# Patient Record
Sex: MALE | Race: WHITE | Employment: UNEMPLOYED | URBAN - METROPOLITAN AREA
[De-identification: names, ages, dates, MRNs, and addresses within clinical notes are randomized per-mention and may not be internally consistent; named-entity substitution may affect disease eponyms.]

---

## 2023-10-09 ENCOUNTER — OFFICE VISIT (OUTPATIENT)
Dept: FAMILY MEDICINE CLINIC | Age: 63
End: 2023-10-09
Payer: COMMERCIAL

## 2023-10-09 VITALS
TEMPERATURE: 98.2 F | WEIGHT: 241.8 LBS | BODY MASS INDEX: 27.98 KG/M2 | SYSTOLIC BLOOD PRESSURE: 112 MMHG | DIASTOLIC BLOOD PRESSURE: 64 MMHG | HEIGHT: 78 IN

## 2023-10-09 DIAGNOSIS — S83.102A KNEE SUBLUXATION, LEFT, INITIAL ENCOUNTER: ICD-10-CM

## 2023-10-09 DIAGNOSIS — M23.204 DEGENERATIVE TEAR OF LEFT MEDIAL MENISCUS: ICD-10-CM

## 2023-10-09 DIAGNOSIS — M25.562 ACUTE PAIN OF LEFT KNEE: Primary | ICD-10-CM

## 2023-10-09 PROCEDURE — 99213 OFFICE O/P EST LOW 20 MIN: CPT | Performed by: FAMILY MEDICINE

## 2023-10-09 RX ORDER — BUSPIRONE HYDROCHLORIDE 30 MG/1
30 TABLET ORAL 2 TIMES DAILY
COMMUNITY
Start: 2023-08-27

## 2023-10-09 RX ORDER — ZOLPIDEM TARTRATE 10 MG/1
10 TABLET ORAL
COMMUNITY
Start: 2023-08-10

## 2023-10-09 RX ORDER — HYDROXYZINE PAMOATE 50 MG/1
CAPSULE ORAL
COMMUNITY
Start: 2023-08-10

## 2023-10-09 RX ORDER — VENLAFAXINE HYDROCHLORIDE 150 MG/1
300 CAPSULE, EXTENDED RELEASE ORAL DAILY
COMMUNITY
Start: 2023-08-10

## 2023-10-09 RX ORDER — LURASIDONE HYDROCHLORIDE 60 MG/1
60 TABLET, FILM COATED ORAL
COMMUNITY
Start: 2023-08-10

## 2023-10-09 RX ORDER — MIRTAZAPINE 30 MG/1
30 TABLET, FILM COATED ORAL
COMMUNITY
Start: 2023-08-03

## 2023-10-09 SDOH — ECONOMIC STABILITY: HOUSING INSECURITY
IN THE LAST 12 MONTHS, WAS THERE A TIME WHEN YOU DID NOT HAVE A STEADY PLACE TO SLEEP OR SLEPT IN A SHELTER (INCLUDING NOW)?: NO

## 2023-10-09 SDOH — ECONOMIC STABILITY: INCOME INSECURITY: HOW HARD IS IT FOR YOU TO PAY FOR THE VERY BASICS LIKE FOOD, HOUSING, MEDICAL CARE, AND HEATING?: NOT HARD AT ALL

## 2023-10-09 SDOH — ECONOMIC STABILITY: FOOD INSECURITY: WITHIN THE PAST 12 MONTHS, YOU WORRIED THAT YOUR FOOD WOULD RUN OUT BEFORE YOU GOT MONEY TO BUY MORE.: NEVER TRUE

## 2023-10-09 SDOH — ECONOMIC STABILITY: FOOD INSECURITY: WITHIN THE PAST 12 MONTHS, THE FOOD YOU BOUGHT JUST DIDN'T LAST AND YOU DIDN'T HAVE MONEY TO GET MORE.: NEVER TRUE

## 2023-10-09 ASSESSMENT — PATIENT HEALTH QUESTIONNAIRE - PHQ9
SUM OF ALL RESPONSES TO PHQ QUESTIONS 1-9: 0
2. FEELING DOWN, DEPRESSED OR HOPELESS: 0
SUM OF ALL RESPONSES TO PHQ QUESTIONS 1-9: 0
SUM OF ALL RESPONSES TO PHQ9 QUESTIONS 1 & 2: 0
SUM OF ALL RESPONSES TO PHQ QUESTIONS 1-9: 0
SUM OF ALL RESPONSES TO PHQ QUESTIONS 1-9: 0
1. LITTLE INTEREST OR PLEASURE IN DOING THINGS: 0

## 2023-10-09 ASSESSMENT — ENCOUNTER SYMPTOMS
CHEST TIGHTNESS: 0
SHORTNESS OF BREATH: 0

## 2023-10-09 NOTE — PROGRESS NOTES
Subjective  Nickolas Deshpande 1960 is a 66 y.o. male who presents today with:  Chief Complaint   Patient presents with    Knee Pain     C/O left knee pain. This has been a concern for about 1 month. Denies injury. Reports his knee feels like it is dislocated when he squats down or crouches. He has some mild swelling. Denies worsening pain at night. Denies discoloration. He has been using Voltaren gel PRN for relief. Depression     He follows with psychiatry. Moods stable with current medications. Health Maintenance     Reports it has been several years since he has had a PCP. No recent labs. Declines colonoscopy. I have reviewed HPI and agree with above. This first happened about a month ago. He was bending over to pick something up and the knee locked up on him. The pain is excruciating. He demonstrates a maneuver he does to get it to pop back in. He does this by flexing his hip and bringing his knee all the way up to his chest and pulling tight. Since it happened that once it is happened several other times. The pain tends to be lateral and posterior. He denies any swelling. It can make it very difficult for him to walk. There is no family history of joint issues. 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 He has never had any other joints \"pop out\". Review of Systems   Respiratory:  Negative for chest tightness and shortness of breath. Cardiovascular:  Negative for chest pain, palpitations and leg swelling. Musculoskeletal:  Negative for neck pain and neck stiffness. Psychiatric/Behavioral:  Positive for depression. All other systems reviewed and are negative. History reviewed. No pertinent past medical history. History reviewed. No pertinent surgical history.   Social History     Socioeconomic History    Marital status:      Spouse name: Not on file    Number of children: Not on file    Years of education: Not on file

## 2023-10-17 ENCOUNTER — HOSPITAL ENCOUNTER (OUTPATIENT)
Dept: MRI IMAGING | Age: 63
Discharge: HOME | End: 2023-10-19
Attending: FAMILY MEDICINE
Payer: COMMERCIAL

## 2023-10-17 DIAGNOSIS — S83.102A KNEE SUBLUXATION, LEFT, INITIAL ENCOUNTER: ICD-10-CM

## 2023-10-17 DIAGNOSIS — M25.562 ACUTE PAIN OF LEFT KNEE: ICD-10-CM

## 2023-10-17 DIAGNOSIS — M23.204 DEGENERATIVE TEAR OF LEFT MEDIAL MENISCUS: ICD-10-CM

## 2023-10-17 PROCEDURE — 73721 MRI JNT OF LWR EXTRE W/O DYE: CPT

## 2023-10-18 DIAGNOSIS — M23.204 DEGENERATIVE TEAR OF LEFT MEDIAL MENISCUS: Primary | ICD-10-CM

## 2023-10-23 ENCOUNTER — OFFICE VISIT (OUTPATIENT)
Dept: ORTHOPEDIC SURGERY | Age: 63
End: 2023-10-23

## 2023-10-23 VITALS
OXYGEN SATURATION: 96 % | TEMPERATURE: 98.4 F | WEIGHT: 241 LBS | BODY MASS INDEX: 27.88 KG/M2 | HEART RATE: 84 BPM | HEIGHT: 78 IN

## 2023-10-23 DIAGNOSIS — S83.232A COMPLEX TEAR OF MEDIAL MENISCUS OF LEFT KNEE AS CURRENT INJURY, INITIAL ENCOUNTER: Primary | ICD-10-CM

## 2023-10-23 RX ORDER — LIDOCAINE HYDROCHLORIDE 10 MG/ML
8 INJECTION, SOLUTION INFILTRATION; PERINEURAL ONCE
Status: SHIPPED | OUTPATIENT
Start: 2023-10-23

## 2023-10-23 RX ORDER — TRIAMCINOLONE ACETONIDE 40 MG/ML
80 INJECTION, SUSPENSION INTRA-ARTICULAR; INTRAMUSCULAR ONCE
Status: SHIPPED | OUTPATIENT
Start: 2023-10-23

## 2023-10-26 ENCOUNTER — OFFICE VISIT (OUTPATIENT)
Dept: FAMILY MEDICINE CLINIC | Age: 63
End: 2023-10-26
Payer: COMMERCIAL

## 2023-10-26 VITALS
TEMPERATURE: 97.8 F | SYSTOLIC BLOOD PRESSURE: 128 MMHG | HEART RATE: 68 BPM | HEIGHT: 78 IN | OXYGEN SATURATION: 97 % | WEIGHT: 237 LBS | BODY MASS INDEX: 27.42 KG/M2 | DIASTOLIC BLOOD PRESSURE: 78 MMHG

## 2023-10-26 DIAGNOSIS — M25.531 WRIST PAIN, RIGHT: Primary | ICD-10-CM

## 2023-10-26 DIAGNOSIS — S83.207D ACUTE MENISCAL TEAR OF KNEE, LEFT, SUBSEQUENT ENCOUNTER: ICD-10-CM

## 2023-10-26 PROCEDURE — G8419 CALC BMI OUT NRM PARAM NOF/U: HCPCS | Performed by: PHYSICIAN ASSISTANT

## 2023-10-26 PROCEDURE — 1036F TOBACCO NON-USER: CPT | Performed by: PHYSICIAN ASSISTANT

## 2023-10-26 PROCEDURE — 3017F COLORECTAL CA SCREEN DOC REV: CPT | Performed by: PHYSICIAN ASSISTANT

## 2023-10-26 PROCEDURE — 99213 OFFICE O/P EST LOW 20 MIN: CPT | Performed by: PHYSICIAN ASSISTANT

## 2023-10-26 PROCEDURE — G8427 DOCREV CUR MEDS BY ELIG CLIN: HCPCS | Performed by: PHYSICIAN ASSISTANT

## 2023-10-26 PROCEDURE — G8484 FLU IMMUNIZE NO ADMIN: HCPCS | Performed by: PHYSICIAN ASSISTANT

## 2023-10-26 RX ORDER — HYDROCODONE BITARTRATE AND ACETAMINOPHEN 5; 325 MG/1; MG/1
1 TABLET ORAL EVERY 8 HOURS PRN
Qty: 21 TABLET | Refills: 0 | Status: SHIPPED | OUTPATIENT
Start: 2023-10-26 | End: 2023-11-02

## 2023-10-26 RX ORDER — MELOXICAM 15 MG/1
15 TABLET ORAL DAILY
Qty: 30 TABLET | Refills: 0 | Status: SHIPPED | OUTPATIENT
Start: 2023-10-26

## 2023-10-26 RX ORDER — METHYLPREDNISOLONE 4 MG/1
TABLET ORAL
Qty: 1 KIT | Refills: 0 | Status: SHIPPED | OUTPATIENT
Start: 2023-10-26 | End: 2023-11-01

## 2023-10-26 NOTE — PROGRESS NOTES
Conjunctiva/sclera: Conjunctivae normal.      Pupils: Pupils are equal, round, and reactive to light. Neck:      Thyroid: No thyromegaly. Cardiovascular:      Heart sounds: No murmur heard. Pulmonary:      Effort: Pulmonary effort is normal. No respiratory distress. Musculoskeletal:         General: Tenderness present. Normal range of motion. Right knee: Tenderness present over the MCL. Comments: +phalens right   Skin:     General: Skin is warm and dry. Coloration: Skin is not jaundiced or pale. Neurological:      Mental Status: He is alert and oriented to person, place, and time. Motor: No weakness. Coordination: Coordination normal.      Gait: Gait normal.   Psychiatric:         Mood and Affect: Mood normal.         Thought Content: Thought content normal.         Judgment: Judgment normal.            Assessment & Plan    Diagnosis Orders   1. Wrist pain, right  [facility] Clavicle Strap, Right      2.  Acute meniscal tear of knee, left, subsequent encounter  methylPREDNISolone (MEDROL DOSEPACK) 4 MG tablet    HYDROcodone-acetaminophen (NORCO) 5-325 MG per tablet    meloxicam (MOBIC) 15 MG tablet    Ambulatory referral to Orthopedic Surgery    [facility] Hinged Knee Support, Left; L            Orders Placed This Encounter   Procedures    Ambulatory referral to Orthopedic Surgery     Referral Priority:   Routine     Referral Type:   Eval and Treat     Referral Reason:   Specialty Services Required     Referred to Provider:   VALARIE Ibrahim     Number of Visits Requested:   1    [facility] Hinged Knee Support, Left; L     Order Specific Question:   Equipment:     Answer:   Hinged Knee Support, Left     Order Specific Question:   Size     Answer:   L    [facility] Clavicle Strap, Right     Order Specific Question:   Equipment:     Answer:   Clavicle Strap, Right     Orders Placed This Encounter

## 2023-10-31 ENCOUNTER — OFFICE VISIT (OUTPATIENT)
Dept: ORTHOPEDIC SURGERY | Age: 63
End: 2023-10-31
Payer: COMMERCIAL

## 2023-10-31 VITALS
BODY MASS INDEX: 27.98 KG/M2 | HEART RATE: 67 BPM | HEIGHT: 77 IN | OXYGEN SATURATION: 99 % | WEIGHT: 237 LBS | TEMPERATURE: 97.3 F

## 2023-10-31 DIAGNOSIS — G56.01 CARPAL TUNNEL SYNDROME OF RIGHT WRIST: Primary | ICD-10-CM

## 2023-10-31 PROCEDURE — 20526 THER INJECTION CARP TUNNEL: CPT | Performed by: PHYSICIAN ASSISTANT

## 2023-10-31 PROCEDURE — G8484 FLU IMMUNIZE NO ADMIN: HCPCS | Performed by: PHYSICIAN ASSISTANT

## 2023-10-31 PROCEDURE — 1036F TOBACCO NON-USER: CPT | Performed by: PHYSICIAN ASSISTANT

## 2023-10-31 PROCEDURE — 3017F COLORECTAL CA SCREEN DOC REV: CPT | Performed by: PHYSICIAN ASSISTANT

## 2023-10-31 PROCEDURE — G8427 DOCREV CUR MEDS BY ELIG CLIN: HCPCS | Performed by: PHYSICIAN ASSISTANT

## 2023-10-31 PROCEDURE — 99214 OFFICE O/P EST MOD 30 MIN: CPT | Performed by: PHYSICIAN ASSISTANT

## 2023-10-31 PROCEDURE — G8419 CALC BMI OUT NRM PARAM NOF/U: HCPCS | Performed by: PHYSICIAN ASSISTANT

## 2023-10-31 RX ORDER — LIDOCAINE HYDROCHLORIDE 10 MG/ML
1 INJECTION, SOLUTION INFILTRATION; PERINEURAL ONCE
Status: COMPLETED | OUTPATIENT
Start: 2023-10-31 | End: 2023-10-31

## 2023-10-31 RX ORDER — TRIAMCINOLONE ACETONIDE 40 MG/ML
40 INJECTION, SUSPENSION INTRA-ARTICULAR; INTRAMUSCULAR ONCE
Status: COMPLETED | OUTPATIENT
Start: 2023-10-31 | End: 2023-10-31

## 2023-10-31 RX ADMIN — LIDOCAINE HYDROCHLORIDE 1 ML: 10 INJECTION, SOLUTION INFILTRATION; PERINEURAL at 16:08

## 2023-10-31 RX ADMIN — TRIAMCINOLONE ACETONIDE 40 MG: 40 INJECTION, SUSPENSION INTRA-ARTICULAR; INTRAMUSCULAR at 16:09

## 2023-10-31 ASSESSMENT — ENCOUNTER SYMPTOMS
RESPIRATORY NEGATIVE: 1
RESPIRATORY NEGATIVE: 1

## 2023-10-31 NOTE — PROGRESS NOTES
Lj Diop (:  1960) is a 66 y.o. male,Established patient, here for evaluation of the following chief complaint(s):  Wrist Pain (Pt here for right wrist pain)         ASSESSMENT/PLAN:  1. Carpal tunnel syndrome of right wrist  -     TX INJECTION THERAPEUTIC CARPAL TUNNEL  -     lidocaine 1 % injection 1 mL; 1 mL, Intra-artICUlar, ONCE, 1 dose, On Tue 10/31/23 at 1530  -     triamcinolone acetonide (KENALOG-40) injection 40 mg; 40 mg, Intra-artICUlar, ONCE, 1 dose, On Tue 10/31/23 at 1530      No follow-ups on file. Subjective   SUBJECTIVE/OBJECTIVE:  This is a 79-year-old right-hand-dominant male complaining of right hand numbness. He states he was told previously has carpal tunnel. States the numbness awakens him from sleep. He has to shake his hand to wake in the hand from sleep. He denies any injury. He does not want to proceed with surgery. Review of Systems   Constitutional: Negative. HENT: Negative. Respiratory: Negative. Skin: Negative. Neurological: Negative. Objective   Physical Exam  Musculoskeletal:      Comments: Right hand-full extension of all fingers. Hand grasp is strong and equal.  Phalen's is positive. Tinel's does elicit very mild neurologic symptoms. Thenar wasting is present. Decree sensation to light touch of the index and middle fingers. Capillary refill is brisk     Procedure-after sterile prep and under aseptic technique 1 cc 1% lidocaine and 1 cc 40 mg/mL Kenalog are injected into the right carpal tunnel. Patient tolerated seizure well. I explained to the patient he would be a candidate for carpal tunnel release surgery but before that we need to repeat his EMG. He states he does not want to proceed with surgery. Would like to proceed with cortisone injection of the right carpal tunnel. I injected the patient's right carpal tunnel today with cortisone.   We will see him back in about 3 weeks  On this date 10/31/2023 I

## 2023-10-31 NOTE — PROGRESS NOTES
Wellington Marshall (:  1960) is a 66 y.o. male,New patient, consult from Dr. Margot Castillo here for evaluation of the following chief complaint(s):  New Patient (Patient presents for left knee pain. He has had an MRI on it. )         ASSESSMENT/PLAN:  1. Complex tear of medial meniscus of left knee as current injury, initial encounter  -     UT ARTHROCENTESIS ASPIR&/INJ MAJOR JT/BURSA W/O US  -     lidocaine 1 % injection 8 mL; 8 mL, Intra-artICUlar, ONCE, 1 dose, On Mon 10/23/23 at 1645  -     triamcinolone acetonide (KENALOG-40) injection 80 mg; 80 mg, Intra-artICUlar, ONCE, 1 dose, On Mon 10/23/23 at 1645      No follow-ups on file. Subjective   SUBJECTIVE/OBJECTIVE:  This is a 59-year-old unemployed male complaining of left knee pain. He states about 6 weeks ago he was bending over and his left knee locked. He states the knee is locked several times. He states having difficulty pivoting on the knee secondary to pain. He states the knee feels stable except when he has the pain. His primary care providerOrdered an MRI scan and is he is here for results. Review of Systems   Constitutional: Negative. HENT: Negative. Respiratory: Negative. Skin: Negative. Neurological: Negative. Objective   Lenora Gupta MD  [phone] 10/18/2023      Narrative & Impression  EXAMINATION:  MRI OF THE LEFT KNEE WITHOUT CONTRAST, 10/17/2023 7:22 pm     TECHNIQUE:  Multiplanar multisequence MRI of the left knee was performed without the  administration of intravenous contrast.     COMPARISON:  None. HISTORY:  ORDERING SYSTEM PROVIDED HISTORY: Acute pain of left knee, Degenerative tear  of left medial meniscus, Knee subluxation, left, initial encounter  TECHNOLOGIST PROVIDED HISTORY:  What reading provider will be dictating this exam?->CRC     FINDINGS:  MENISCI: Oblique horizontal tear of the posterior horn medial meniscus  extending into the body segment.   Intact lateral

## 2023-11-29 ENCOUNTER — OFFICE VISIT (OUTPATIENT)
Dept: FAMILY MEDICINE CLINIC | Age: 63
End: 2023-11-29
Payer: COMMERCIAL

## 2023-11-29 VITALS
WEIGHT: 233 LBS | DIASTOLIC BLOOD PRESSURE: 84 MMHG | TEMPERATURE: 97.5 F | HEIGHT: 77 IN | SYSTOLIC BLOOD PRESSURE: 136 MMHG | HEART RATE: 69 BPM | OXYGEN SATURATION: 97 % | BODY MASS INDEX: 27.51 KG/M2

## 2023-11-29 DIAGNOSIS — S83.207D ACUTE MENISCAL TEAR OF KNEE, LEFT, SUBSEQUENT ENCOUNTER: Primary | ICD-10-CM

## 2023-11-29 DIAGNOSIS — E78.2 MIXED HYPERLIPIDEMIA: ICD-10-CM

## 2023-11-29 DIAGNOSIS — M25.50 ARTHRALGIA, UNSPECIFIED JOINT: ICD-10-CM

## 2023-11-29 DIAGNOSIS — Z13.220 LIPID SCREENING: ICD-10-CM

## 2023-11-29 DIAGNOSIS — Z12.11 COLON CANCER SCREENING: ICD-10-CM

## 2023-11-29 DIAGNOSIS — Z12.5 PROSTATE CANCER SCREENING: ICD-10-CM

## 2023-11-29 DIAGNOSIS — Z13.1 ENCOUNTER FOR SCREENING EXAMINATION FOR IMPAIRED GLUCOSE REGULATION AND DIABETES MELLITUS: ICD-10-CM

## 2023-11-29 PROBLEM — E78.5 HYPERLIPIDEMIA: Status: ACTIVE | Noted: 2023-11-29

## 2023-11-29 PROCEDURE — 3017F COLORECTAL CA SCREEN DOC REV: CPT | Performed by: PHYSICIAN ASSISTANT

## 2023-11-29 PROCEDURE — G8484 FLU IMMUNIZE NO ADMIN: HCPCS | Performed by: PHYSICIAN ASSISTANT

## 2023-11-29 PROCEDURE — G8427 DOCREV CUR MEDS BY ELIG CLIN: HCPCS | Performed by: PHYSICIAN ASSISTANT

## 2023-11-29 PROCEDURE — 1036F TOBACCO NON-USER: CPT | Performed by: PHYSICIAN ASSISTANT

## 2023-11-29 PROCEDURE — G8419 CALC BMI OUT NRM PARAM NOF/U: HCPCS | Performed by: PHYSICIAN ASSISTANT

## 2023-11-29 PROCEDURE — 99204 OFFICE O/P NEW MOD 45 MIN: CPT | Performed by: PHYSICIAN ASSISTANT

## 2023-11-29 RX ORDER — PRAMIPEXOLE DIHYDROCHLORIDE 0.5 MG/1
0.5 TABLET ORAL DAILY
COMMUNITY
Start: 2023-11-16

## 2023-11-29 RX ORDER — HYDROCODONE BITARTRATE AND ACETAMINOPHEN 5; 325 MG/1; MG/1
1 TABLET ORAL EVERY 8 HOURS PRN
Qty: 21 TABLET | Refills: 0 | Status: CANCELLED | OUTPATIENT
Start: 2023-11-29 | End: 2023-12-06

## 2023-11-29 NOTE — PROGRESS NOTES
Subjective  Wellington Rolando, 66 y.o. male presents today with:  Chief Complaint   Patient presents with    Establish Care     Patient presents today to establish care. Health Maintenance     Patient refused colonoscopy. HPI  Patient is here to establish care accompanied by his ex-wife past medical history is significant for symptoms of anxiety depression GI distress heartburn and joint pain  Patient suffered a tear of the medial meniscus of his left knee approximately a month ago-he is scheduled to start therapy through orthopedics  In addition, he has symptoms of carpal tunnel of the right wrist steroid injection placed a month ago was successful in addtion to use of a  wrist splint provided at last visit  He is concerned he may have a connective tissue disorder Erhlos Danlos due to various complaints over the yrs. reports increased flexibility of his hips knees and arms during childhood  Patient has not had a PCP in many years  States he was prescribed Crestor for hyperlipidemia but stopped it due to worsening muscle pain  Reports h.o of heart murmur -complains of shortness of breath with minimal exertion and lightheadedness when rising from sitting recurrent for many years  Former smoker 4 packs/day quit 18 years ago no history of COPD/asthma  Patient treats symptoms of anxiety with marijuana which also improved his appetite. Admits to frequent red meat and morgan fat food    Review of Systems   Musculoskeletal:  Positive for arthralgias. All other systems reviewed and are negative.         Past Medical History:   Diagnosis Date    Hyperlipidemia      Past Surgical History:   Procedure Laterality Date    ABDOMINAL HERNIA REPAIR Left [place]    trauma from fall    NASAL SEPTUM SURGERY  1982     Social History     Socioeconomic History    Marital status:      Spouse name: Not on file    Number of children: Not on file    Years of education: Not on file    Highest education

## 2023-11-30 ENCOUNTER — OFFICE VISIT (OUTPATIENT)
Dept: ORTHOPEDIC SURGERY | Age: 63
End: 2023-11-30
Payer: COMMERCIAL

## 2023-11-30 VITALS
HEIGHT: 77 IN | HEART RATE: 64 BPM | TEMPERATURE: 97 F | OXYGEN SATURATION: 99 % | BODY MASS INDEX: 27.51 KG/M2 | WEIGHT: 233 LBS

## 2023-11-30 DIAGNOSIS — S83.232D COMPLEX TEAR OF MEDIAL MENISCUS OF LEFT KNEE AS CURRENT INJURY, SUBSEQUENT ENCOUNTER: Primary | ICD-10-CM

## 2023-11-30 DIAGNOSIS — M25.50 ARTHRALGIA, UNSPECIFIED JOINT: ICD-10-CM

## 2023-11-30 DIAGNOSIS — Z12.5 PROSTATE CANCER SCREENING: ICD-10-CM

## 2023-11-30 DIAGNOSIS — Z13.220 LIPID SCREENING: ICD-10-CM

## 2023-11-30 DIAGNOSIS — G56.01 CARPAL TUNNEL SYNDROME OF RIGHT WRIST: ICD-10-CM

## 2023-11-30 DIAGNOSIS — Z13.1 ENCOUNTER FOR SCREENING EXAMINATION FOR IMPAIRED GLUCOSE REGULATION AND DIABETES MELLITUS: ICD-10-CM

## 2023-11-30 DIAGNOSIS — Z12.11 COLON CANCER SCREENING: ICD-10-CM

## 2023-11-30 LAB
ALBUMIN SERPL-MCNC: 4.9 G/DL (ref 3.5–4.6)
ALP SERPL-CCNC: 85 U/L (ref 35–104)
ALT SERPL-CCNC: 12 U/L (ref 0–41)
ANION GAP SERPL CALCULATED.3IONS-SCNC: 13 MEQ/L (ref 9–15)
AST SERPL-CCNC: 12 U/L (ref 0–40)
BASOPHILS # BLD: 0.1 K/UL (ref 0–0.2)
BASOPHILS NFR BLD: 0.6 %
BILIRUB SERPL-MCNC: 0.4 MG/DL (ref 0.2–0.7)
BUN SERPL-MCNC: 14 MG/DL (ref 8–23)
CALCIUM SERPL-MCNC: 9.7 MG/DL (ref 8.5–9.9)
CHLORIDE SERPL-SCNC: 105 MEQ/L (ref 95–107)
CHOLEST SERPL-MCNC: 267 MG/DL (ref 0–199)
CO2 SERPL-SCNC: 24 MEQ/L (ref 20–31)
CREAT SERPL-MCNC: 1.25 MG/DL (ref 0.7–1.2)
EOSINOPHIL # BLD: 0.1 K/UL (ref 0–0.7)
EOSINOPHIL NFR BLD: 1.3 %
ERYTHROCYTE [DISTWIDTH] IN BLOOD BY AUTOMATED COUNT: 14.2 % (ref 11.5–14.5)
ERYTHROCYTE [SEDIMENTATION RATE] IN BLOOD BY WESTERGREN METHOD: 13 MM (ref 0–20)
GLOBULIN SER CALC-MCNC: 2.8 G/DL (ref 2.3–3.5)
GLUCOSE SERPL-MCNC: 108 MG/DL (ref 70–99)
HCT VFR BLD AUTO: 47.5 % (ref 42–52)
HDLC SERPL-MCNC: 42 MG/DL (ref 40–59)
HGB BLD-MCNC: 15.7 G/DL (ref 14–18)
LDL CHOLESTEROL CALCULATED: 192 MG/DL (ref 0–129)
LYMPHOCYTES # BLD: 3.5 K/UL (ref 1–4.8)
LYMPHOCYTES NFR BLD: 35.1 %
MCH RBC QN AUTO: 30 PG (ref 27–31.3)
MCHC RBC AUTO-ENTMCNC: 33.1 % (ref 33–37)
MCV RBC AUTO: 90.6 FL (ref 79–92.2)
MONOCYTES # BLD: 0.5 K/UL (ref 0.2–0.8)
MONOCYTES NFR BLD: 5 %
NEUTROPHILS # BLD: 5.8 K/UL (ref 1.4–6.5)
NEUTS SEG NFR BLD: 57.7 %
PLATELET # BLD AUTO: 185 K/UL (ref 130–400)
PLATELET BLD QL SMEAR: ADEQUATE
POTASSIUM SERPL-SCNC: 4.3 MEQ/L (ref 3.4–4.9)
PROT SERPL-MCNC: 7.7 G/DL (ref 6.3–8)
PSA SERPL-MCNC: 1.89 NG/ML (ref 0–4)
RBC # BLD AUTO: 5.24 M/UL (ref 4.7–6.1)
SODIUM SERPL-SCNC: 142 MEQ/L (ref 135–144)
TRIGLYCERIDE, FASTING: 166 MG/DL (ref 0–150)
WBC # BLD AUTO: 10.1 K/UL (ref 4.8–10.8)

## 2023-11-30 PROCEDURE — G8419 CALC BMI OUT NRM PARAM NOF/U: HCPCS | Performed by: PHYSICIAN ASSISTANT

## 2023-11-30 PROCEDURE — 3017F COLORECTAL CA SCREEN DOC REV: CPT | Performed by: PHYSICIAN ASSISTANT

## 2023-11-30 PROCEDURE — G8484 FLU IMMUNIZE NO ADMIN: HCPCS | Performed by: PHYSICIAN ASSISTANT

## 2023-11-30 PROCEDURE — 1036F TOBACCO NON-USER: CPT | Performed by: PHYSICIAN ASSISTANT

## 2023-11-30 PROCEDURE — 99214 OFFICE O/P EST MOD 30 MIN: CPT | Performed by: PHYSICIAN ASSISTANT

## 2023-11-30 PROCEDURE — G8427 DOCREV CUR MEDS BY ELIG CLIN: HCPCS | Performed by: PHYSICIAN ASSISTANT

## 2023-11-30 ASSESSMENT — ENCOUNTER SYMPTOMS: RESPIRATORY NEGATIVE: 1

## 2023-11-30 NOTE — PROGRESS NOTES
Leda Valdes (:  1960) is a 66 y.o. male,Established patient, here for evaluation of the following chief complaint(s):  Follow-up (Pt here for f/u for left knee)         ASSESSMENT/PLAN:  1. Complex tear of medial meniscus of left knee as current injury, subsequent encounter  2. Carpal tunnel syndrome of right wrist      No follow-ups on file. Subjective   SUBJECTIVE/OBJECTIVE:  This is 79-year-old male following up for complaint of left knee pain secondary to medial meniscal tear. Patient states his knee pain is tolerable. He states occasionally he will get a tinge of pain with certain movements. He has not started physical therapy. He has stated that they called but he did not respond. He states his hand is doing much better after carpal tunnel cortisone injection. Review of Systems   Constitutional: Negative. HENT: Negative. Respiratory: Negative. Skin: Negative. Neurological: Negative. Objective   Physical Exam  Musculoskeletal:      Comments: Knee-no joint effusion, no popliteal cyst.  No warmth, erythema, fluctuance or sign of infection. Full extension, flexion 125 degrees which is symmetrical.  Knee joint is stable, there is no laxity with valgus or varus stress. Tenderness with palpation of the medial femoral condyle. Medial and lateral joint lines are nontender. Pierre femoral crepitus with range of motion. Calf is soft and nontender. Explained the patient he does have a degenerative meniscal tear. Explained he does not not have to proceed with surgery unless he is having incidences of instability or locking. I like the patient to begin physical therapy however he is hesitant. I printed off some exercises that he may do at home. Like to see him back in about 2 months.        On this date 2023 I have spent 35 minutes reviewing previous notes, test results and face to face with the patient discussing the diagnosis and importance of

## 2023-12-01 LAB — RHEUMATOID FACTOR: <10 IU/ML

## 2023-12-03 LAB — NUCLEAR IGG SER QL IA: NORMAL
